# Patient Record
Sex: MALE | Race: WHITE | NOT HISPANIC OR LATINO | Employment: FULL TIME | URBAN - METROPOLITAN AREA
[De-identification: names, ages, dates, MRNs, and addresses within clinical notes are randomized per-mention and may not be internally consistent; named-entity substitution may affect disease eponyms.]

---

## 2023-02-15 PROBLEM — E66.3 OVERWEIGHT WITH BODY MASS INDEX (BMI) OF 28 TO 28.9 IN ADULT: Status: ACTIVE | Noted: 2023-02-15

## 2023-02-15 PROBLEM — M79.10 MYALGIA: Status: ACTIVE | Noted: 2023-02-15

## 2023-02-15 PROBLEM — K63.5 COLON POLYPS: Status: ACTIVE | Noted: 2023-02-15

## 2023-02-15 PROBLEM — E29.1 HYPOGONADISM, MALE: Status: ACTIVE | Noted: 2023-02-15

## 2023-02-15 PROBLEM — R93.1 AGATSTON CORONARY ARTERY CALCIUM SCORE LESS THAN 100: Status: ACTIVE | Noted: 2023-02-15

## 2023-02-15 PROBLEM — R74.8 ELEVATED LIVER ENZYMES: Status: ACTIVE | Noted: 2023-02-15

## 2023-02-15 PROBLEM — I10 BENIGN HYPERTENSION: Status: ACTIVE | Noted: 2023-02-15

## 2023-02-15 PROBLEM — D72.819 LEUKOPENIA: Status: ACTIVE | Noted: 2023-02-15

## 2023-02-15 PROBLEM — J30.9 ALLERGIC RHINITIS: Status: ACTIVE | Noted: 2023-02-15

## 2023-02-15 PROBLEM — E03.8 SUBCLINICAL HYPOTHYROIDISM: Status: ACTIVE | Noted: 2023-02-15

## 2023-02-15 PROBLEM — I10 HYPERTENSION: Status: ACTIVE | Noted: 2023-02-15

## 2023-02-15 PROBLEM — K20.90 ESOPHAGITIS: Status: ACTIVE | Noted: 2023-02-15

## 2023-02-15 PROBLEM — E78.5 HYPERLIPIDEMIA: Status: ACTIVE | Noted: 2023-02-15

## 2023-02-15 PROBLEM — K44.9 HIATAL HERNIA: Status: ACTIVE | Noted: 2023-02-15

## 2023-02-15 PROBLEM — R73.01 ELEVATED FASTING BLOOD SUGAR: Status: ACTIVE | Noted: 2023-02-15

## 2023-02-15 RX ORDER — OMEPRAZOLE 40 MG/1
1 CAPSULE, DELAYED RELEASE ORAL DAILY
COMMUNITY
Start: 2022-06-14 | End: 2023-05-01

## 2023-02-15 RX ORDER — TESTOSTERONE 30 MG/1.5ML
1 SOLUTION TOPICAL DAILY
COMMUNITY
Start: 2014-02-26

## 2023-02-15 RX ORDER — AMLODIPINE BESYLATE 10 MG/1
1 TABLET ORAL DAILY
COMMUNITY
Start: 2022-06-23

## 2023-02-15 RX ORDER — SELENIUM SULFIDE 2.5 MG/100ML
LOTION TOPICAL
COMMUNITY
Start: 2013-08-21

## 2023-02-15 RX ORDER — TADALAFIL 20 MG/1
1 TABLET ORAL AS NEEDED
COMMUNITY
Start: 2014-02-26

## 2023-02-15 RX ORDER — DESLORATADINE 5 MG/1
1 TABLET ORAL DAILY
COMMUNITY
Start: 2014-02-26

## 2023-02-15 RX ORDER — FLUTICASONE PROPIONATE 50 MCG
2 SPRAY, SUSPENSION (ML) NASAL DAILY
COMMUNITY
Start: 2014-02-26

## 2023-02-15 RX ORDER — LISINOPRIL 40 MG/1
1 TABLET ORAL DAILY
COMMUNITY
Start: 2019-05-17 | End: 2023-03-11

## 2023-02-15 RX ORDER — ATORVASTATIN CALCIUM 20 MG/1
1 TABLET, FILM COATED ORAL DAILY
COMMUNITY
Start: 2019-05-17

## 2023-03-11 DIAGNOSIS — I10 ESSENTIAL (PRIMARY) HYPERTENSION: ICD-10-CM

## 2023-03-11 RX ORDER — LISINOPRIL 40 MG/1
TABLET ORAL
Qty: 90 TABLET | Refills: 1 | Status: SHIPPED | OUTPATIENT
Start: 2023-03-11 | End: 2023-08-10

## 2023-04-13 ENCOUNTER — APPOINTMENT (OUTPATIENT)
Dept: PRIMARY CARE | Facility: CLINIC | Age: 53
End: 2023-04-13
Payer: COMMERCIAL

## 2023-04-29 DIAGNOSIS — I10 ESSENTIAL (PRIMARY) HYPERTENSION: ICD-10-CM

## 2023-04-29 DIAGNOSIS — K20.90 ESOPHAGITIS: ICD-10-CM

## 2023-05-01 RX ORDER — AMLODIPINE BESYLATE 2.5 MG/1
TABLET ORAL
Qty: 90 TABLET | Refills: 1 | Status: SHIPPED | OUTPATIENT
Start: 2023-05-01

## 2023-05-01 RX ORDER — OMEPRAZOLE 40 MG/1
CAPSULE, DELAYED RELEASE ORAL
Qty: 90 CAPSULE | Refills: 1 | Status: SHIPPED | OUTPATIENT
Start: 2023-05-01

## 2023-05-03 ENCOUNTER — APPOINTMENT (OUTPATIENT)
Dept: PRIMARY CARE | Facility: CLINIC | Age: 53
End: 2023-05-03
Payer: COMMERCIAL

## 2023-05-24 ENCOUNTER — APPOINTMENT (OUTPATIENT)
Dept: PRIMARY CARE | Facility: CLINIC | Age: 53
End: 2023-05-24
Payer: COMMERCIAL

## 2023-06-20 ENCOUNTER — APPOINTMENT (OUTPATIENT)
Dept: PRIMARY CARE | Facility: CLINIC | Age: 53
End: 2023-06-20
Payer: COMMERCIAL

## 2023-07-13 ENCOUNTER — APPOINTMENT (OUTPATIENT)
Dept: PRIMARY CARE | Facility: CLINIC | Age: 53
End: 2023-07-13
Payer: COMMERCIAL

## 2023-08-08 DIAGNOSIS — I10 ESSENTIAL (PRIMARY) HYPERTENSION: ICD-10-CM

## 2023-08-10 RX ORDER — LISINOPRIL 40 MG/1
TABLET ORAL
Qty: 30 TABLET | Refills: 0 | Status: SHIPPED | OUTPATIENT
Start: 2023-08-10 | End: 2024-03-11

## 2023-10-19 ENCOUNTER — OFFICE VISIT (OUTPATIENT)
Dept: DERMATOLOGY | Facility: CLINIC | Age: 53
End: 2023-10-19
Payer: COMMERCIAL

## 2023-10-19 DIAGNOSIS — B07.8 OTHER VIRAL WARTS: ICD-10-CM

## 2023-10-19 DIAGNOSIS — B07.0 PLANTAR WART: Primary | ICD-10-CM

## 2023-10-19 PROCEDURE — 17110 DESTRUCTION B9 LES UP TO 14: CPT | Performed by: NURSE PRACTITIONER

## 2023-10-19 NOTE — PROGRESS NOTES
Subjective     Mikey Valadez is a 52 y.o. male who presents for the following: Wart (Presents to office today for LN2 to left plantar wart. No further complaints.).     Review of Systems:  No other skin or systemic complaints other than what is documented elsewhere in the note.    The following portions of the chart were reviewed this encounter and updated as appropriate:          Skin Cancer History  No skin cancer on file.      Specialty Problems    None       Objective   Well appearing patient in no apparent distress; mood and affect are within normal limits.    A focused skin examination was performed. All findings within normal limits unless otherwise noted below.    Assessment/Plan   1. Plantar wart    Related Procedures  Follow Up In Dermatology - Established Patient    2. Other viral warts  Right Foot - Posterior  Verrucous, hyperkeratotic papules    Warts   - Warts are benign growths which are the result of a specific viral infection in the top layer top layer of the skin. As with other infections, warts may be transmitted from one individual to another.   - Warts are transmitted directly by touching another person with warts, or indirectly by touching an object with the wart virus on it. It may take months for the growth to develop after contacting the virus.   - Multiple treatments are often necessary to clear warts.   - There are multiple treatments for warts, none of which is 100% effective. No matter what treatment, 1 in 3 treated warts will recur.   - Hand out given to patient.     Plan  - Cryotherapy in office, patient tolerated well.   - Risks, benefits, side effects, alternatives and options were discussed with patient and the patient voiced understanding.      Destr of lesion - Right Foot - Posterior  Complexity: simple    Destruction method: cryotherapy    Informed consent: discussed and consent obtained    Lesion destroyed using liquid nitrogen: Yes    Cryotherapy cycles:  2  Outcome: patient  tolerated procedure well with no complications    Post-procedure details: wound care instructions given

## 2023-10-31 ENCOUNTER — ANCILLARY PROCEDURE (OUTPATIENT)
Dept: RADIOLOGY | Facility: CLINIC | Age: 53
End: 2023-10-31
Payer: COMMERCIAL

## 2023-10-31 DIAGNOSIS — Z91.89 OTHER SPECIFIED PERSONAL RISK FACTORS, NOT ELSEWHERE CLASSIFIED: ICD-10-CM

## 2023-10-31 PROCEDURE — 75571 CT HRT W/O DYE W/CA TEST: CPT | Mod: LIO

## 2023-11-02 ENCOUNTER — APPOINTMENT (OUTPATIENT)
Dept: DERMATOLOGY | Facility: CLINIC | Age: 53
End: 2023-11-02
Payer: COMMERCIAL

## 2023-11-30 ENCOUNTER — APPOINTMENT (OUTPATIENT)
Dept: DERMATOLOGY | Facility: CLINIC | Age: 53
End: 2023-11-30
Payer: COMMERCIAL

## 2023-12-13 ENCOUNTER — APPOINTMENT (OUTPATIENT)
Dept: DERMATOLOGY | Facility: CLINIC | Age: 53
End: 2023-12-13
Payer: COMMERCIAL

## 2024-01-02 ENCOUNTER — APPOINTMENT (OUTPATIENT)
Dept: DERMATOLOGY | Facility: CLINIC | Age: 54
End: 2024-01-02
Payer: COMMERCIAL

## 2024-01-04 ENCOUNTER — APPOINTMENT (OUTPATIENT)
Dept: DERMATOLOGY | Facility: CLINIC | Age: 54
End: 2024-01-04
Payer: COMMERCIAL

## 2024-01-30 ENCOUNTER — APPOINTMENT (OUTPATIENT)
Dept: DERMATOLOGY | Facility: CLINIC | Age: 54
End: 2024-01-30
Payer: COMMERCIAL

## 2024-02-06 ENCOUNTER — OFFICE VISIT (OUTPATIENT)
Dept: DERMATOLOGY | Facility: CLINIC | Age: 54
End: 2024-02-06
Payer: COMMERCIAL

## 2024-02-06 DIAGNOSIS — B07.0 PLANTAR WART: Primary | ICD-10-CM

## 2024-02-06 PROCEDURE — 17110 DESTRUCTION B9 LES UP TO 14: CPT | Performed by: NURSE PRACTITIONER

## 2024-02-07 NOTE — PROGRESS NOTES
Subjective     Mikey Valadez is a 53 y.o. male who presents for the following: Wart (Plantar wart (left).).     Review of Systems:  No other skin or systemic complaints other than what is documented elsewhere in the note.    The following portions of the chart were reviewed this encounter and updated as appropriate:  Tobacco  Allergies  Meds  Problems  Med Hx  Surg Hx  Fam Hx         Skin Cancer History  No skin cancer on file.      Specialty Problems    None       Objective   Well appearing patient in no apparent distress; mood and affect are within normal limits.    A full examination was performed including scalp, head, eyes, ears, nose, lips, neck, chest, axillae, abdomen, back, buttocks, bilateral upper extremities, bilateral lower extremities, hands, feet, fingers, toes, fingernails, and toenails. All findings within normal limits unless otherwise noted below.    Assessment/Plan   1. Plantar wart (2)  Left 2nd Metatarsal Plantar Area, Left Tip of Hallux  Verrucous papule(s)/plaque(s)    -Discussed nature of condition  -Multiple treatments in office are often needed  -Diligent at home therapy is often needed  -Cryotherapy today  -Possible side effects of liquid nitrogen treatment reviewed including formation of blisters, crusting, tenderness, scar, and discoloration which may be permanent.  -Patient advised to return the office for re-evaluation if the treated lesion(s) do not resolve within 4-6 weeks. Patient verbalizes understanding.    Destr of lesion - Left 2nd Metatarsal Plantar Area, Left Tip of Hallux  Complexity: simple    Destruction method: cryotherapy    Informed consent: discussed and consent obtained    Lesion destroyed using liquid nitrogen: Yes    Cryotherapy cycles:  2  Outcome: patient tolerated procedure well with no complications    Post-procedure details: wound care instructions given

## 2024-03-09 DIAGNOSIS — I10 ESSENTIAL (PRIMARY) HYPERTENSION: ICD-10-CM

## 2024-03-11 RX ORDER — LISINOPRIL 40 MG/1
TABLET ORAL
Qty: 30 TABLET | Refills: 0 | Status: SHIPPED | OUTPATIENT
Start: 2024-03-11 | End: 2024-03-11 | Stop reason: ALTCHOICE

## 2024-03-11 RX ORDER — TADALAFIL 20 MG/1
20 TABLET ORAL AS NEEDED
Qty: 10 TABLET | Status: CANCELLED | OUTPATIENT
Start: 2024-03-11

## 2024-03-13 ENCOUNTER — APPOINTMENT (OUTPATIENT)
Dept: DERMATOLOGY | Facility: CLINIC | Age: 54
End: 2024-03-13
Payer: COMMERCIAL

## 2024-04-10 ENCOUNTER — APPOINTMENT (OUTPATIENT)
Dept: DERMATOLOGY | Facility: CLINIC | Age: 54
End: 2024-04-10
Payer: COMMERCIAL

## 2024-05-08 ENCOUNTER — APPOINTMENT (OUTPATIENT)
Dept: DERMATOLOGY | Facility: CLINIC | Age: 54
End: 2024-05-08
Payer: COMMERCIAL

## 2024-06-05 ENCOUNTER — APPOINTMENT (OUTPATIENT)
Dept: DERMATOLOGY | Facility: CLINIC | Age: 54
End: 2024-06-05
Payer: COMMERCIAL

## 2024-06-24 ENCOUNTER — APPOINTMENT (OUTPATIENT)
Dept: DERMATOLOGY | Facility: CLINIC | Age: 54
End: 2024-06-24
Payer: COMMERCIAL

## 2024-10-09 ENCOUNTER — APPOINTMENT (OUTPATIENT)
Dept: DERMATOLOGY | Facility: CLINIC | Age: 54
End: 2024-10-09
Payer: COMMERCIAL

## 2024-12-08 ENCOUNTER — OFFICE VISIT (OUTPATIENT)
Dept: URGENT CARE | Age: 54
End: 2024-12-08
Payer: COMMERCIAL

## 2024-12-08 ENCOUNTER — HOSPITAL ENCOUNTER (EMERGENCY)
Facility: HOSPITAL | Age: 54
Discharge: HOME | End: 2024-12-08
Payer: COMMERCIAL

## 2024-12-08 ENCOUNTER — APPOINTMENT (OUTPATIENT)
Dept: RADIOLOGY | Facility: HOSPITAL | Age: 54
End: 2024-12-08
Payer: COMMERCIAL

## 2024-12-08 VITALS
DIASTOLIC BLOOD PRESSURE: 99 MMHG | HEIGHT: 71 IN | HEART RATE: 97 BPM | SYSTOLIC BLOOD PRESSURE: 149 MMHG | TEMPERATURE: 97.9 F | BODY MASS INDEX: 28.7 KG/M2 | OXYGEN SATURATION: 95 % | RESPIRATION RATE: 18 BRPM | WEIGHT: 205 LBS

## 2024-12-08 VITALS
HEART RATE: 78 BPM | RESPIRATION RATE: 16 BRPM | TEMPERATURE: 98 F | SYSTOLIC BLOOD PRESSURE: 138 MMHG | OXYGEN SATURATION: 97 % | DIASTOLIC BLOOD PRESSURE: 88 MMHG

## 2024-12-08 DIAGNOSIS — M17.11 ARTHROPATHY OF RIGHT KNEE: Primary | ICD-10-CM

## 2024-12-08 DIAGNOSIS — R60.0 LOWER EXTREMITY EDEMA: ICD-10-CM

## 2024-12-08 DIAGNOSIS — M25.561 ACUTE PAIN OF RIGHT KNEE: Primary | ICD-10-CM

## 2024-12-08 LAB
ALBUMIN SERPL BCP-MCNC: 4.6 G/DL (ref 3.4–5)
ALP SERPL-CCNC: 55 U/L (ref 33–120)
ALT SERPL W P-5'-P-CCNC: 15 U/L (ref 10–52)
ANION GAP SERPL CALC-SCNC: 15 MMOL/L (ref 10–20)
AST SERPL W P-5'-P-CCNC: 19 U/L (ref 9–39)
BASOPHILS # BLD AUTO: 0.02 X10*3/UL (ref 0–0.1)
BASOPHILS NFR BLD AUTO: 0.3 %
BASOPHILS NFR FLD MANUAL: 0 %
BILIRUB SERPL-MCNC: 1.3 MG/DL (ref 0–1.2)
BLASTS NFR FLD MANUAL: 0 %
BUN SERPL-MCNC: 15 MG/DL (ref 6–23)
CALCIUM SERPL-MCNC: 9.7 MG/DL (ref 8.6–10.3)
CHLORIDE SERPL-SCNC: 100 MMOL/L (ref 98–107)
CLARITY FLD: ABNORMAL
CO2 SERPL-SCNC: 25 MMOL/L (ref 21–32)
COLOR FLD: YELLOW
CREAT SERPL-MCNC: 0.83 MG/DL (ref 0.5–1.3)
CRP SERPL-MCNC: 7.25 MG/DL
EGFRCR SERPLBLD CKD-EPI 2021: >90 ML/MIN/1.73M*2
EOSINOPHIL # BLD AUTO: 0.04 X10*3/UL (ref 0–0.7)
EOSINOPHIL NFR BLD AUTO: 0.5 %
EOSINOPHIL NFR FLD MANUAL: 0 %
ERYTHROCYTE [DISTWIDTH] IN BLOOD BY AUTOMATED COUNT: 13.1 % (ref 11.5–14.5)
ERYTHROCYTE [SEDIMENTATION RATE] IN BLOOD BY WESTERGREN METHOD: 29 MM/H (ref 0–20)
GLUCOSE SERPL-MCNC: 101 MG/DL (ref 74–99)
HCT VFR BLD AUTO: 36.9 % (ref 41–52)
HGB BLD-MCNC: 13.3 G/DL (ref 13.5–17.5)
IMM GRANULOCYTES # BLD AUTO: 0.04 X10*3/UL (ref 0–0.7)
IMM GRANULOCYTES NFR BLD AUTO: 0.5 % (ref 0–0.9)
IMMATURE GRANULOCYTES IN FLUID: 0 %
LACTATE SERPL-SCNC: 0.9 MMOL/L (ref 0.4–2)
LYMPHOCYTES # BLD AUTO: 0.65 X10*3/UL (ref 1.2–4.8)
LYMPHOCYTES NFR BLD AUTO: 8.4 %
LYMPHOCYTES NFR FLD MANUAL: 4 %
MCH RBC QN AUTO: 31.1 PG (ref 26–34)
MCHC RBC AUTO-ENTMCNC: 36 G/DL (ref 32–36)
MCV RBC AUTO: 86 FL (ref 80–100)
MONOCYTES # BLD AUTO: 0.78 X10*3/UL (ref 0.1–1)
MONOCYTES NFR BLD AUTO: 10.1 %
MONOS+MACROS NFR FLD MANUAL: 1 %
NEUTROPHILS # BLD AUTO: 6.22 X10*3/UL (ref 1.2–7.7)
NEUTROPHILS NFR BLD AUTO: 80.2 %
NEUTROPHILS NFR FLD MANUAL: 95 %
NRBC BLD-RTO: 0 /100 WBCS (ref 0–0)
OTHER CELLS NFR FLD MANUAL: 0 %
PLASMA CELLS NFR FLD MANUAL: 0 %
PLATELET # BLD AUTO: 161 X10*3/UL (ref 150–450)
POTASSIUM SERPL-SCNC: 3.2 MMOL/L (ref 3.5–5.3)
PROT SERPL-MCNC: 7.2 G/DL (ref 6.4–8.2)
RBC # BLD AUTO: 4.28 X10*6/UL (ref 4.5–5.9)
RBC # FLD AUTO: 2000 /UL
SODIUM SERPL-SCNC: 137 MMOL/L (ref 136–145)
TOTAL CELLS COUNTED FLD: 100
URATE SERPL-MCNC: 7.8 MG/DL (ref 4–7.5)
WBC # BLD AUTO: 7.8 X10*3/UL (ref 4.4–11.3)
WBC # FLD AUTO: ABNORMAL /UL

## 2024-12-08 PROCEDURE — 80053 COMPREHEN METABOLIC PANEL: CPT | Performed by: PHYSICIAN ASSISTANT

## 2024-12-08 PROCEDURE — 87205 SMEAR GRAM STAIN: CPT | Mod: PORLAB | Performed by: PHYSICIAN ASSISTANT

## 2024-12-08 PROCEDURE — 73564 X-RAY EXAM KNEE 4 OR MORE: CPT | Mod: RT

## 2024-12-08 PROCEDURE — 3075F SYST BP GE 130 - 139MM HG: CPT

## 2024-12-08 PROCEDURE — 73564 X-RAY EXAM KNEE 4 OR MORE: CPT | Mod: RIGHT SIDE | Performed by: RADIOLOGY

## 2024-12-08 PROCEDURE — 3079F DIAST BP 80-89 MM HG: CPT

## 2024-12-08 PROCEDURE — 83605 ASSAY OF LACTIC ACID: CPT | Performed by: PHYSICIAN ASSISTANT

## 2024-12-08 PROCEDURE — 82945 GLUCOSE OTHER FLUID: CPT | Mod: PORLAB | Performed by: PHYSICIAN ASSISTANT

## 2024-12-08 PROCEDURE — 20611 DRAIN/INJ JOINT/BURSA W/US: CPT | Performed by: PHYSICIAN ASSISTANT

## 2024-12-08 PROCEDURE — 89051 BODY FLUID CELL COUNT: CPT | Performed by: PHYSICIAN ASSISTANT

## 2024-12-08 PROCEDURE — 86140 C-REACTIVE PROTEIN: CPT | Performed by: PHYSICIAN ASSISTANT

## 2024-12-08 PROCEDURE — 99204 OFFICE O/P NEW MOD 45 MIN: CPT

## 2024-12-08 PROCEDURE — 2500000004 HC RX 250 GENERAL PHARMACY W/ HCPCS (ALT 636 FOR OP/ED): Performed by: PHYSICIAN ASSISTANT

## 2024-12-08 PROCEDURE — 99284 EMERGENCY DEPT VISIT MOD MDM: CPT | Mod: 25

## 2024-12-08 PROCEDURE — 2500000001 HC RX 250 WO HCPCS SELF ADMINISTERED DRUGS (ALT 637 FOR MEDICARE OP): Performed by: PHYSICIAN ASSISTANT

## 2024-12-08 PROCEDURE — 85025 COMPLETE CBC W/AUTO DIFF WBC: CPT | Performed by: PHYSICIAN ASSISTANT

## 2024-12-08 PROCEDURE — 96374 THER/PROPH/DIAG INJ IV PUSH: CPT

## 2024-12-08 PROCEDURE — 84157 ASSAY OF PROTEIN OTHER: CPT | Mod: PORLAB | Performed by: PHYSICIAN ASSISTANT

## 2024-12-08 PROCEDURE — 96375 TX/PRO/DX INJ NEW DRUG ADDON: CPT

## 2024-12-08 PROCEDURE — 36415 COLL VENOUS BLD VENIPUNCTURE: CPT | Performed by: PHYSICIAN ASSISTANT

## 2024-12-08 PROCEDURE — 84550 ASSAY OF BLOOD/URIC ACID: CPT | Performed by: PHYSICIAN ASSISTANT

## 2024-12-08 PROCEDURE — 89060 EXAM SYNOVIAL FLUID CRYSTALS: CPT | Mod: PORLAB | Performed by: PHYSICIAN ASSISTANT

## 2024-12-08 PROCEDURE — 85652 RBC SED RATE AUTOMATED: CPT | Performed by: PHYSICIAN ASSISTANT

## 2024-12-08 RX ORDER — KETOROLAC TROMETHAMINE 30 MG/ML
30 INJECTION, SOLUTION INTRAMUSCULAR; INTRAVENOUS ONCE
Status: COMPLETED | OUTPATIENT
Start: 2024-12-08 | End: 2024-12-08

## 2024-12-08 RX ORDER — ONDANSETRON HYDROCHLORIDE 2 MG/ML
4 INJECTION, SOLUTION INTRAVENOUS ONCE
Status: COMPLETED | OUTPATIENT
Start: 2024-12-08 | End: 2024-12-08

## 2024-12-08 RX ORDER — DOXYCYCLINE 100 MG/1
100 CAPSULE ORAL ONCE
Status: COMPLETED | OUTPATIENT
Start: 2024-12-08 | End: 2024-12-08

## 2024-12-08 RX ORDER — LIDOCAINE HYDROCHLORIDE 10 MG/ML
10 INJECTION, SOLUTION INFILTRATION; PERINEURAL ONCE
Status: COMPLETED | OUTPATIENT
Start: 2024-12-08 | End: 2024-12-08

## 2024-12-08 RX ORDER — COLCHICINE 0.6 MG/1
0.6 TABLET ORAL 2 TIMES DAILY
Qty: 20 TABLET | Refills: 0 | Status: SHIPPED | OUTPATIENT
Start: 2024-12-08 | End: 2024-12-18

## 2024-12-08 RX ORDER — MORPHINE SULFATE 2 MG/ML
2 INJECTION, SOLUTION INTRAMUSCULAR; INTRAVENOUS ONCE
Status: COMPLETED | OUTPATIENT
Start: 2024-12-08 | End: 2024-12-08

## 2024-12-08 RX ORDER — INDOMETHACIN 25 MG/1
25 CAPSULE ORAL 3 TIMES DAILY PRN
Qty: 9 CAPSULE | Refills: 0 | Status: SHIPPED | OUTPATIENT
Start: 2024-12-08 | End: 2024-12-11

## 2024-12-08 RX ORDER — DOXYCYCLINE 100 MG/1
100 CAPSULE ORAL 2 TIMES DAILY
Qty: 14 CAPSULE | Refills: 0 | Status: SHIPPED | OUTPATIENT
Start: 2024-12-08 | End: 2024-12-15

## 2024-12-08 RX ORDER — HYDROCODONE BITARTRATE AND ACETAMINOPHEN 5; 325 MG/1; MG/1
1 TABLET ORAL EVERY 6 HOURS PRN
Qty: 12 TABLET | Refills: 0 | Status: SHIPPED | OUTPATIENT
Start: 2024-12-08 | End: 2024-12-11

## 2024-12-08 ASSESSMENT — LIFESTYLE VARIABLES
EVER FELT BAD OR GUILTY ABOUT YOUR DRINKING: NO
TOTAL SCORE: 0
HAVE YOU EVER FELT YOU SHOULD CUT DOWN ON YOUR DRINKING: NO
EVER HAD A DRINK FIRST THING IN THE MORNING TO STEADY YOUR NERVES TO GET RID OF A HANGOVER: NO
HAVE PEOPLE ANNOYED YOU BY CRITICIZING YOUR DRINKING: NO

## 2024-12-08 ASSESSMENT — ENCOUNTER SYMPTOMS
JOINT SWELLING: 1
WOUND: 0
COLOR CHANGE: 1
SHORTNESS OF BREATH: 0
VOMITING: 0
ARTHRALGIAS: 1
NAUSEA: 0
DIARRHEA: 0
FATIGUE: 0
FEVER: 0

## 2024-12-08 ASSESSMENT — PAIN SCALES - GENERAL
PAINLEVEL_OUTOF10: 10 - WORST POSSIBLE PAIN
PAINLEVEL_OUTOF10: 8
PAINLEVEL_OUTOF10: 9
PAINLEVEL_OUTOF10: 8
PAINLEVEL_OUTOF10: 6

## 2024-12-08 ASSESSMENT — PAIN DESCRIPTION - FREQUENCY: FREQUENCY: CONSTANT/CONTINUOUS

## 2024-12-08 ASSESSMENT — PAIN DESCRIPTION - PAIN TYPE: TYPE: ACUTE PAIN

## 2024-12-08 ASSESSMENT — COLUMBIA-SUICIDE SEVERITY RATING SCALE - C-SSRS
2. HAVE YOU ACTUALLY HAD ANY THOUGHTS OF KILLING YOURSELF?: NO
6. HAVE YOU EVER DONE ANYTHING, STARTED TO DO ANYTHING, OR PREPARED TO DO ANYTHING TO END YOUR LIFE?: NO
1. IN THE PAST MONTH, HAVE YOU WISHED YOU WERE DEAD OR WISHED YOU COULD GO TO SLEEP AND NOT WAKE UP?: NO

## 2024-12-08 ASSESSMENT — PAIN DESCRIPTION - ORIENTATION: ORIENTATION: RIGHT

## 2024-12-08 ASSESSMENT — PAIN DESCRIPTION - LOCATION: LOCATION: KNEE

## 2024-12-08 ASSESSMENT — PAIN DESCRIPTION - DESCRIPTORS: DESCRIPTORS: SHARP

## 2024-12-08 ASSESSMENT — PAIN - FUNCTIONAL ASSESSMENT: PAIN_FUNCTIONAL_ASSESSMENT: 0-10

## 2024-12-08 NOTE — PROGRESS NOTES
Subjective   Patient ID: Mikey Valadez is a 54 y.o. male. They present today with a chief complaint of knee pain right  (Denies injury. Onset Monday).    History of Present Illness  Patient presents with right knee pain that began 6 days ago. Explains that he has had no injury to the area, denies any fall or blunt force trauma. Explains that over the last week right knee has become increasingly painful and today it is hot, swollen and patient is unable to bear weight on right knee. Denies fever, chills, sweats. Denies n/v/d. Denies chest pain, sob.   Denies any previous open wound or other skin changes.           Past Medical History  Allergies as of 12/08/2024    (No Known Allergies)       (Not in a hospital admission)       No past medical history on file.    Past Surgical History:   Procedure Laterality Date    OTHER SURGICAL HISTORY  02/28/2019    Tonsillectomy            Review of Systems  Review of Systems   Constitutional:  Negative for fatigue and fever.   Respiratory:  Negative for shortness of breath.    Cardiovascular:  Negative for chest pain.   Gastrointestinal:  Negative for diarrhea, nausea and vomiting.   Musculoskeletal:  Positive for arthralgias and joint swelling.   Skin:  Positive for color change. Negative for rash and wound.                                  Objective    Vitals:    12/08/24 1140   BP: 138/88   Pulse: 78   Resp: 16   Temp: 36.7 °C (98 °F)   SpO2: 97%     No LMP for male patient.    Physical Exam  Constitutional:       General: He is not in acute distress.     Appearance: He is not ill-appearing.   Musculoskeletal:      Right knee: Swelling and erythema present. Decreased range of motion. Tenderness present. Normal pulse.      Right lower leg: Swelling present. 1+ Pitting Edema present.      Comments: Right knee with generalized swelling, erythema, and tenderness. Decreased flexion secondary to pain. Lower right extremity with pitting edema.    Skin:     Findings: Erythema  present. No abrasion or abscess.      Comments: Generalized erythema to anterior right knee.    Neurological:      Mental Status: He is alert.         Procedures    Point of Care Test & Imaging Results from this visit  No results found for this visit on 12/08/24.   XR knee right 4+ views    Result Date: 12/8/2024  Interpreted By:  Antolin Mcdonald, STUDY: XR KNEE RIGHT 4+ VIEWS; 12/8/2024 1:33 pm   INDICATION: Signs/Symptoms:pain, swelling   COMPARISON: None.   ACCESSION NUMBER(S): [id number]   ORDERING CLINICIAN: OTTO MTZ   TECHNIQUE: Number of films: Four view radiographs of the right knee.   FINDINGS: No fractures or destructive lesions are identified. The joint spaces and articular surfaces are maintained. The alignment is anatomic. The soft tissues are unremarkable. There is moderate effusion in the suprapatellar bursa.       Moderate suprapatellar joint effusion without underlying fracture or subluxation.   Signed by: Antolin Mcdonald 12/8/2024 1:37 PM Dictation workstation:   [id number]     Diagnostic study results (if any) were reviewed by Pati Vyas PA-C.    Assessment/Plan   Allergies, medications, history, and pertinent labs/EKGs/Imaging reviewed by Pati Vyas PA-C.     Medical Decision Making  MDM- Patient presents today with history and physical exam consistent with concern for possible septic arthritis vs DVT. At this time patient will be sent to ER for further evaluation, labs, imaging and possible iv antibiotic. Patient transferred via private vehicle to ER with family member. Patient and family member verbalized understanding and agree with plan.     Orders and Diagnoses  Diagnoses and all orders for this visit:  Acute pain of right knee  Lower extremity edema      Medical Admin Record      Patient disposition: Home    Electronically signed by Pati Vyas PA-C  2:10 PM

## 2024-12-08 NOTE — Clinical Note
Mikey Valadez was seen and treated in our emergency department on 12/8/2024.  He may return to work on 12/16/2024.  Mikey has limited mobility and will need to work from home from now until 12/16/24.     If you have any questions or concerns, please don't hesitate to call.      Donovan Mcconnell PA-C

## 2024-12-08 NOTE — ED PROVIDER NOTES
HPI   Chief Complaint   Patient presents with    concern septic right knee       history of present illness: 54-year-old male complains of right knee pain for the last few days.  Denies any specific injury.  He is having some swelling in his feet on and off for the last couple months.  He was taken off the hydrochlorothiazide because it was felt that it was possibly causing gout.  Patient has not had analysis of his joints for confirmation.  Says that he has some redness of the knee and it is painful for him to ambulate.  Says the pain is 9 out of 10, aching, persistent, worse with placing weight on the knee.  Patient states he is able to partially bend the knee though it is painful to fully flex or extend.  Denies fevers chills sweats nausea vomiting lightheadedness dizziness.  He was seen at urgent care and is recommended for him to go to the emergency department for joint analysis.    Review of systems: Constitutional, eye, ENT, cardiovascular, respiratory, gastrointestinal, genitourinary, neurologic, musculoskeletal, dermatologic, hematologic, endocrine systems were evaluated and were negative unless otherwise specified in history of present illness.    Medications: Reviewed and per nursing note.    Family history: Denies relevant medical conditions.    Past medical history: None per patient    Social history: Denies tobacco, alcohol, drug use.      Physical exam:    Appearance: Well-developed, well-nourished, nontoxic-appearing, alert and oriented x3. Talking in complete sentences.    HEENT:  Head normocephalic atraumatic,    NECK:  Nml Inspection    Respiratory: Clear to auscultation    Cardiovascular: Regular rate and rhythm. Capillary refill less than 3 seconds on all extremities.    Neuro:  Oriented x 3, Speech Clear, cranial nerves grossly intact. Normal sensation to light touch in all 4 extremities. Deep tendon reflexes 2+ symmetrically in upper and lower extremities.    Musculoskeletal: Tenderness right  anterior knee with slight erythema.  Able to flex and extend the knee but just not completely.  Ballottement of the knee.  No induration of skin.  Patient spontaneously moves all 4 extremities with 5/5 muscle strength.    Skin:  No cyanosis, clubbing, open wounds, or rashes.            Patient History   No past medical history on file.  Past Surgical History:   Procedure Laterality Date    OTHER SURGICAL HISTORY  02/28/2019    Tonsillectomy     Family History   Problem Relation Name Age of Onset    Hyperlipidemia Mother      Hypertension Mother      Skin cancer Mother      Leukemia Father      Heart attack Maternal Grandfather      Other (heavy smoker) Maternal Grandfather      Aneurysm Paternal Grandfather       Social History     Tobacco Use    Smoking status: Unknown    Smokeless tobacco: Not on file   Substance Use Topics    Alcohol use: Not on file    Drug use: Not on file       Physical Exam   ED Triage Vitals [12/08/24 1213]   Temperature Heart Rate Respirations BP   36.9 °C (98.4 °F) 73 16 148/79      Pulse Ox Temp Source Heart Rate Source Patient Position   98 % Temporal Monitor Sitting      BP Location FiO2 (%)     Left arm --       Physical Exam      ED Course & MDM   Diagnoses as of 12/08/24 1614   Arthropathy of right knee                 No data recorded     Rebekah Coma Scale Score: 15 (12/08/24 1211 : Sharlene Plata RN)                           Medical Decision Making  XR knee right 4+ views   Final Result    Moderate suprapatellar joint effusion without underlying fracture or    subluxation.          Signed by: Antolin Mcdonald 12/8/2024 1:37 PM    Dictation workstation:   [id number]     Point of Care Ultrasound    (Results Pending)    Labs Reviewed  CBC WITH AUTO DIFFERENTIAL - Abnormal     WBC                           7.8                    nRBC                          0.0                    RBC                           4.28 (*)               Hemoglobin                    13.3 (*)                Hematocrit                    36.9 (*)               MCV                           86                     MCH                           31.1                   MCHC                          36.0                   RDW                           13.1                   Platelets                     161                    Neutrophils %                 80.2                   Immature Granulocytes %, Automated   0.5                    Lymphocytes %                 8.4                    Monocytes %                   10.1                   Eosinophils %                 0.5                    Basophils %                   0.3                    Neutrophils Absolute          6.22                   Immature Granulocytes Absolute, Au*   0.04                   Lymphocytes Absolute          0.65 (*)               Monocytes Absolute            0.78                   Eosinophils Absolute          0.04                   Basophils Absolute            0.02                COMPREHENSIVE METABOLIC PANEL - Abnormal     Glucose                       101 (*)                Sodium                        137                    Potassium                     3.2 (*)                Chloride                      100                    Bicarbonate                   25                     Anion Gap                     15                     Urea Nitrogen                 15                     Creatinine                    0.83                   eGFR                          >90                    Calcium                       9.7                    Albumin                       4.6                    Alkaline Phosphatase          55                     Total Protein                 7.2                    AST                           19                     Bilirubin, Total              1.3 (*)                ALT                           15                  C-REACTIVE PROTEIN - Abnormal     C-Reactive Protein            7.25 (*)             SEDIMENTATION RATE, AUTOMATED - Abnormal     Sedimentation Rate            29 (*)              BODY FLUID CELL COUNT - Abnormal     Color, Fluid                  Yellow                 Clarity, Fluid                Cloudy (*)               WBC, Fluid                    26,934                 RBC, Fluid                    2,000                      Narrative: Body Fluid cell count reference ranges have not been established by [facility]. Based on published references.  URIC ACID - Abnormal     Uric Acid                     7.8 (*)             LACTATE - Normal     Lactate                       0.9                        Narrative: Venipuncture immediately after or during the administration of Metamizole may lead to falsely low results. Testing should be performed immediately prior to Metamizole dosing.  STERILE FLUID CULTURE/SMEAR  BODY FLUID CELL COUNT WITH DIFFERENTIAL         Narrative: The following orders were created for panel order Body Fluid Cell Count With Differential.                  Procedure                               Abnormality         Status                                     ---------                               -----------         ------                                     Body Fluid Cell Count[[id number]]        Abnormal            Final result                               Body Fluid Differential[[id number]]                          Final result                                                 Please view results for these tests on the individual orders.  BODY FLUID CRYSTAL AND PATHOLOGIST REVIEW         Narrative: The following orders were created for panel order Crystal Identification and Pathologist Review Synovial Fluid.                  Procedure                               Abnormality         Status                                     ---------                               -----------         ------                                     Crystal Identification,  ...[[id number]]                      In process                                                   Please view results for these tests on the individual orders.  GLUCOSE, FLUID         Narrative: The following orders were created for panel order Glucose, Fluid.                  Procedure                               Abnormality         Status                                     ---------                               -----------         ------                                     Glucose, Fluid[[id number]]                                   In process                                                   Please view results for these tests on the individual orders.  PROTEIN, TOTAL FLUID         Narrative: The following orders were created for panel order Protein, Total Fluid.                  Procedure                               Abnormality         Status                                     ---------                               -----------         ------                                     Protein, Total Fluid[[id number]]                             In process                                                   Please view results for these tests on the individual orders.  BODY FLUID CRYSTAL  GLUCOSE, FLUID  PROTEIN, TOTAL FLUID  BODY FLUID CELL DIFFERENTIAL      Patient presents with right knee pain and swelling.  Differential diagnosis of gout, pseudogout, septic arthritis, fracture, knee effusion, osteoarthritis.  Examination shows tenderness of the knee.  Patient has erythema with no significant induration.  He is afebrile nontoxic-appearing.  He does have limited flexion extension of the knee.  Since it is red and he has limited range of motion evaluation for septic arthritis was initiated.  There is higher suspicion of underlying gout as he has had multiple recent joint pain issues and was taking hydrochlorothiazide previously.    CBC CMP sed rate CRP uric acid serum levels x-ray right knee and synovial fluid  analysis was performed.  Patient was given morphine and Zofran offering some improvement of pain.    Arthrocentesis was performed.  Blood work showed CBC with normal white blood cell count making septic arthritis less likely with hemoglobin 13.3.  CRP slightly elevated at 7.25, normal lactate, uric acid level elevated at 7.8.  Chemistry shows glucose 101 potassium 3.2 otherwise normal.  Sed rate slightly elevated 29.  X-ray of the right knee shows knee effusion.  Bedside ultrasound shows that the fluid is primarily below the patella into the joint space and is not primarily a bursa.    Synovial fluid aspiration performed.  Synovial fluid was slightly clear yellow without any purulence.  Cell count shows cloudy yellow with white blood cells at 26,000.  Between 20 and 50,000 and is more likely to be crystal arthropathy.  Since it is not over the 50,000 threshold it is unlikely to be septic arthritis.  Patient's presentation does not entirely lend itself to a septic arthritis.  His uric acid level was also found to be elevated.    Patient will be treated as a most likely gout arthropathy.  He is given prescription for colchicine indomethacin and Norco.  Although less likely septic arthritis is less likely.  Culture is pending as well as crystal analysis protein glucose.  Gram stain is not initially performed at this facility.  Patient will need to follow and trend.  For less likely but possible skin infection and antibiotic prophylaxis doxycycline is initiated.    Patient will be discharged to home with prescription for colchicine, indomethacin, Norco, doxycycline.  Patient is educated in signs and symptoms of worsening symptoms and reasons to come back to the emergency department.  Will need to follow up with orthopedics.  Patient does not report social determinants of health impacting ability to obtain care that is needed.  Patient agrees with plan.    This is a transcription.  Text was reviewed for errors, but some  transcription errors may remain.  Please call for any questions.          Procedure  Arthrocentesis    Performed by: Donovan Mcconnell PA-C  Authorized by: Greta Duvall DO    Consent:     Consent obtained:  Verbal    Consent given by:  Patient    Risks, benefits, and alternatives were discussed: yes      Risks discussed:  Bleeding, infection, pain and incomplete drainage    Alternatives discussed:  No treatment, delayed treatment and observation  Universal protocol:     Procedure explained and questions answered to patient or proxy's satisfaction: yes      Relevant documents present and verified: yes      Test results available: yes      Immediately prior to procedure, a time out was called: yes      Patient identity confirmed:  Verbally with patient  Location:     Location:  Knee    Knee:  R knee  Anesthesia:     Anesthesia method:  Local infiltration    Local anesthetic:  Lidocaine 1% w/o epi  Procedure details:     Preparation: Patient was prepped and draped in usual sterile fashion      Needle gauge:  18 G    Ultrasound guidance: yes      Approach:  Lateral    Aspirate amount:  70mL    Aspirate characteristics:  Yellow    Steroid injected: no      Specimen collected: yes    Post-procedure details:     Dressing:  Adhesive bandage    Procedure completion:  Tolerated well, no immediate complications       Donovan Mcconnell PA-C  12/08/24 5816

## 2024-12-09 LAB — CRYSTALS FLD MICRO: NORMAL

## 2024-12-11 LAB
GLUCOSE FLD-MCNC: 90 MG/DL
PROT FLD-MCNC: 3.8 G/DL

## 2024-12-14 LAB
BACTERIA FLD CULT: NORMAL
GRAM STN SPEC: NORMAL
GRAM STN SPEC: NORMAL

## 2025-01-06 ENCOUNTER — APPOINTMENT (OUTPATIENT)
Dept: ORTHOPEDIC SURGERY | Facility: CLINIC | Age: 55
End: 2025-01-06
Payer: COMMERCIAL

## 2025-01-06 VITALS — WEIGHT: 200 LBS | BODY MASS INDEX: 28 KG/M2 | HEIGHT: 71 IN

## 2025-01-06 DIAGNOSIS — M25.561 RIGHT KNEE PAIN, UNSPECIFIED CHRONICITY: ICD-10-CM

## 2025-01-06 DIAGNOSIS — M17.11 ARTHROPATHY OF RIGHT KNEE: Primary | ICD-10-CM

## 2025-01-06 PROCEDURE — 99204 OFFICE O/P NEW MOD 45 MIN: CPT | Performed by: STUDENT IN AN ORGANIZED HEALTH CARE EDUCATION/TRAINING PROGRAM

## 2025-01-06 PROCEDURE — 3008F BODY MASS INDEX DOCD: CPT | Performed by: STUDENT IN AN ORGANIZED HEALTH CARE EDUCATION/TRAINING PROGRAM

## 2025-01-06 RX ORDER — MELOXICAM 15 MG/1
15 TABLET ORAL DAILY
Qty: 30 TABLET | Refills: 1 | Status: SHIPPED | OUTPATIENT
Start: 2025-01-06 | End: 2025-03-07

## 2025-01-06 ASSESSMENT — PAIN - FUNCTIONAL ASSESSMENT: PAIN_FUNCTIONAL_ASSESSMENT: 0-10

## 2025-01-06 ASSESSMENT — PAIN SCALES - GENERAL: PAINLEVEL_OUTOF10: 1

## 2025-01-06 NOTE — PROGRESS NOTES
PRIMARY CARE PHYSICIAN: No Assigned PCP Generic Provider, MD  REFERRING PROVIDER: Donovan Mcconnell PA-C  [address] [place] [address]     CONSULT ORTHOPAEDIC: Knee Evaluation    ASSESSMENT & PLAN    Impression: 54 y.o. male with right knee patellofemoral pain syndrome and synovitis with possible gout versus pseudogout flare.    Plan:   I explained to the patient the nature of their diagnosis.  I reviewed their imaging studies with them.    Based on the history, physical exam and imaging studies above, the patient's presentation is consistent with the above diagnosis.  I had a long discussion with the patient regarding their presentation and the treatment options.  We discussed initial nonoperative versus operative management options as well as potential further diagnostic imaging.  I reviewed the patient's x-ray findings with him.  He has mild degenerative changes without acute osseous abnormality.  We discussed his treatment options going forward.  I provided him with a referral to physical therapy to work on quadricep strengthening and hamstring flexibility.  I provided him with a prescription for meloxicam as an anti-inflammatory.  He will monitor his progress and return to see us as needed.    Follow-Up: Patient will follow-up as needed    At the end of the visit, all questions were answered in full. The patient is in agreement with the plan and recommendations. They will call the office with any questions/concerns.    Note dictated with [employer] software. Completed without full typed error editing and sent to avoid delay.       SUBJECTIVE  CHIEF COMPLAINT:   Chief Complaint   Patient presents with    Right Knee - Pain        HPI: Mikey Valadez is a 54 y.o. patient who presents today with R knee problem. Seen in the ED on 12/8/24 with XR and aspiration. Tore MCL 30 years ago. No HX of sx, no injections. Reports pain for 1 month. Reports popping sound in the knee over the last few  days.  He notes that the knee will give way on him.  It affects all of his daily activities.  He notes that the knee has improved since his aspiration in the emergency room.  Aspiration was negative for infection and negative for crystals.  His uric acid was slightly elevated.    They deny any constant or progressive numbness or tingling in their legs.     REVIEW OF SYSTEMS  Constitutional: See HPI for pain assessment, No significant weight loss, recent trauma  Cardiovascular: No chest pain, shortness of breath  Respiratory: No difficulty breathing, cough  Gastrointestinal: No nausea, vomiting, diarrhea, constipation  Musculoskeletal: Noted in HPI, positive for pain, restricted motion, stiffness  Integumentary: No rashes, easy bruising, redness   Neurological: no numbness or tingling in extremities, no gait disturbances   Psychiatric: No mood changes, memory changes, social issues  Heme/Lymph: no excessive swelling, easy bruising, excessive bleeding  ENT: No hearing changes  Eyes: No vision changes    No past medical history on file.     No Known Allergies     Past Surgical History:   Procedure Laterality Date    OTHER SURGICAL HISTORY  02/28/2019    Tonsillectomy        Family History   Problem Relation Name Age of Onset    Hyperlipidemia Mother      Hypertension Mother      Skin cancer Mother      Leukemia Father      Heart attack Maternal Grandfather      Other (heavy smoker) Maternal Grandfather      Aneurysm Paternal Grandfather          Social History     Socioeconomic History    Marital status:      Spouse name: Not on file    Number of children: Not on file    Years of education: Not on file    Highest education level: Not on file   Occupational History    Not on file   Tobacco Use    Smoking status: Unknown    Smokeless tobacco: Not on file   Substance and Sexual Activity    Alcohol use: Not on file    Drug use: Not on file    Sexual activity: Not on file   Other Topics Concern    Not on file  "  Social History Narrative    Not on file     Social Drivers of Health     Financial Resource Strain: Not on file   Food Insecurity: Not on file   Transportation Needs: Not on file   Physical Activity: Not on file   Stress: Not on file   Social Connections: Not on file   Intimate Partner Violence: Not on file   Housing Stability: Not on file        CURRENT MEDICATIONS:   Current Outpatient Medications   Medication Sig Dispense Refill    amLODIPine (Norvasc) 10 mg tablet Take 1 tablet (10 mg) by mouth once daily.      amLODIPine (Norvasc) 2.5 mg tablet TAKE 1 TABLET BY MOUTH EVERY DAY 90 tablet 1    atorvastatin (Lipitor) 20 mg tablet Take 1 tablet (20 mg) by mouth once daily.      desloratadine (Clarinex) 5 mg tablet Take 1 tablet (5 mg) by mouth once daily.      fluticasone (Flonase) 50 mcg/actuation nasal spray Administer 2 sprays into each nostril once daily.      omeprazole (PriLOSEC) 40 mg DR capsule TAKE 1 CAPSULE BY MOUTH EVERY DAY 90 capsule 1    selenium sulfide (Selsun) 2.5 % shampoo Apply topically.      tadalafil 20 mg tablet Take 1 tablet (20 mg) by mouth if needed. 1 HOUR BEFOREACTIVITY      testosterone (Axiron) 30 mg/actuation (1.5 mL) topical solution Place 1 Pump on the skin once daily.       No current facility-administered medications for this visit.        OBJECTIVE    PHYSICAL EXAM      12/8/2021    12:39 PM 6/23/2022     9:25 AM 8/23/2022     7:09 AM 11/2/2022    12:48 PM 12/8/2024    11:40 AM 12/8/2024    12:13 PM 12/8/2024     4:22 PM   Vitals   Systolic 180 162  150 138 148 149   Diastolic 90 100  90 88 79 99   BP Location      Left arm    Heart Rate 92    78 73 97   Temp 36.6 °C (97.8 °F) 36.4 °C (97.5 °F)  36.5 °C (97.7 °F) 36.7 °C (98 °F) 36.9 °C (98.4 °F) 36.6 °C (97.9 °F)   Resp 16    16 16 18   Height 1.803 m (5' 11\")  1.801 m (5' 10.91\")   1.803 m (5' 11\")    Weight (lb) 207 219 219.36 210.25  205    BMI 28.87 kg/m2 30.54 kg/m2 30.68 kg/m2 29.4 kg/m2  28.59 kg/m2    BSA (m2) 2.17 m2 " 2.23 m2 2.23 m2 2.18 m2  2.16 m2    Visit Report     Report Report Report      There is no height or weight on file to calculate BMI.    GENERAL: A/Ox3, NAD. Appears healthy, well nourished  PSYCHIATRIC: Mood stable, appropriate memory recall  EYES: EOM intact, no scleral icterus  CARDIOVASCULAR: Palpable peripheral pulses  LUNGS: Breathing non-labored on room air  SKIN: no erythema, rashes, or ecchymoses     MUSCULOSKELETAL:  Laterality: right Knee Exam  - Skin intact  - No erythema or warmth  - No ecchymosis or soft tissue swelling  - Alignment: neutral  - Palpation: Mild tenderness medial and lateral joint lines, mild tenderness medial and lateral patellar facet  - ROM: 0 - 0 - 130, patella mobility 1+ medial and lateral with mild crepitus, no apprehension  - Effusion: Small  - Strength: knee extension and flexion 5/5, EHL/PF/DF motor intact  - Stability:        Anterior drawer stable       Posterior drawer stable       Varus/valgus stable       negative Lachman  -Equivocal Cordell's  - Gait: Mildly antalgic  - Hip Exam: flexion to 100+ degrees, full extension, internal/external rotation adequate, and no pain with log roll    NEUROVASCULAR:  - Neurovascular Status: sensation intact to light touch distally, lower extremity motor intact  - Capillary refill brisk at extremities, Bilateral dorsalis pedis pulse 2+    Imaging: Multiple views of the affected right knee(s) demonstrate: No significant osseous normality, no fracture, no significant degenerative change.   X-rays were personally reviewed and interpreted by me.  Radiology reports were reviewed by me as well, if readily available at the time.      Henrik Schaffer MD  Attending Surgeon    Sports Medicine Orthopaedic Surgery  [facility]

## 2025-01-15 ENCOUNTER — APPOINTMENT (OUTPATIENT)
Dept: ORTHOPEDIC SURGERY | Age: 55
End: 2025-01-15
Payer: COMMERCIAL

## 2025-01-29 ENCOUNTER — TELEPHONE (OUTPATIENT)
Dept: ORTHOPEDIC SURGERY | Age: 55
End: 2025-01-29

## 2025-01-29 ENCOUNTER — APPOINTMENT (OUTPATIENT)
Dept: ORTHOPEDIC SURGERY | Age: 55
End: 2025-01-29
Payer: COMMERCIAL

## 2025-01-29 VITALS — BODY MASS INDEX: 28 KG/M2 | HEIGHT: 71 IN | WEIGHT: 200 LBS

## 2025-01-29 DIAGNOSIS — S83.241A ACUTE MEDIAL MENISCUS TEAR OF RIGHT KNEE, INITIAL ENCOUNTER: ICD-10-CM

## 2025-01-29 PROCEDURE — 3008F BODY MASS INDEX DOCD: CPT | Performed by: STUDENT IN AN ORGANIZED HEALTH CARE EDUCATION/TRAINING PROGRAM

## 2025-01-29 PROCEDURE — 99214 OFFICE O/P EST MOD 30 MIN: CPT | Performed by: STUDENT IN AN ORGANIZED HEALTH CARE EDUCATION/TRAINING PROGRAM

## 2025-01-29 NOTE — TELEPHONE ENCOUNTER
Sigrid called from [facility] and requested that order for MRI R Knee be sent to Fax: [phone].

## 2025-01-29 NOTE — PROGRESS NOTES
PRIMARY CARE PHYSICIAN: No Assigned PCP Generic Provider, MD  REFERRING PROVIDER: No referring provider defined for this encounter.     CONSULT ORTHOPAEDIC: Knee Evaluation    ASSESSMENT & PLAN    Impression: 54 y.o. male with right knee pain and dysfunction concerning for medial meniscus tear.    Plan:   I explained to the patient the nature of their diagnosis.  I reviewed their imaging studies with them.    Based on the history, physical exam and imaging studies above, the patient's presentation is consistent with the above diagnosis.  I had a long discussion with the patient regarding their presentation and the treatment options.  We discussed initial nonoperative versus operative management options as well as potential further diagnostic imaging.  I reviewed the patient's x-ray findings with him.  He had a new twisting injury to the knee which has resulted in pain, swelling and mechanical symptoms.  His knee will give way on him.  This affects all of his daily activities.  I recommend at this point we proceed with an MRI of the right knee to evaluate the status of his soft tissues and rule out a displaced medial meniscus tear that may require surgical intervention.    The MRI is medically necessary and indicated given the patient's subjective complaints and physical exam findings as described below . The MRI will be used for potential presurgical planning purposes. The MRI should be performed in a hospital setting so the surgeon has immediate access to the images.    Follow-Up: Patient will follow-up after the MRI is completed to review the imaging studies and discuss a treatment plan going forward    At the end of the visit, all questions were answered in full. The patient is in agreement with the plan and recommendations. They will call the office with any questions/concerns.    Note dictated with [employer] software. Completed without full typed error editing and sent to avoid delay.        SUBJECTIVE  CHIEF COMPLAINT: Right knee pain    HPI: Mikey Valadez is a 54 y.o. patient who presents today with R knee problem. Patient states on 1/22/25 he was working in the garage when he was on his knee went to stand, twisted his knee and felt immediate pain.  His knee gave way on him.  He has had swelling and mechanical symptoms including catching clicking and locking since.  He localizes his pain to the medial joint line.    Please see below for full history from prior visit:    Seen in the ED on 12/8/24 with XR and aspiration. Tore MCL 30 years ago. No HX of sx, no injections. Reports pain for 1 month. Reports popping sound in the knee over the last few days.  He notes that the knee will give way on him.  It affects all of his daily activities.  He notes that the knee has improved since his aspiration in the emergency room.  Aspiration was negative for infection and negative for crystals.  His uric acid was slightly elevated.    They deny any constant or progressive numbness or tingling in their legs.     REVIEW OF SYSTEMS  Constitutional: See HPI for pain assessment, No significant weight loss, recent trauma  Cardiovascular: No chest pain, shortness of breath  Respiratory: No difficulty breathing, cough  Gastrointestinal: No nausea, vomiting, diarrhea, constipation  Musculoskeletal: Noted in HPI, positive for pain, restricted motion, stiffness  Integumentary: No rashes, easy bruising, redness   Neurological: no numbness or tingling in extremities, no gait disturbances   Psychiatric: No mood changes, memory changes, social issues  Heme/Lymph: no excessive swelling, easy bruising, excessive bleeding  ENT: No hearing changes  Eyes: No vision changes    No past medical history on file.     No Known Allergies     Past Surgical History:   Procedure Laterality Date    OTHER SURGICAL HISTORY  02/28/2019    Tonsillectomy        Family History   Problem Relation Name Age of Onset    Hyperlipidemia Mother       Hypertension Mother      Skin cancer Mother      Leukemia Father      Heart attack Maternal Grandfather      Other (heavy smoker) Maternal Grandfather      Aneurysm Paternal Grandfather          Social History     Socioeconomic History    Marital status:      Spouse name: Not on file    Number of children: Not on file    Years of education: Not on file    Highest education level: Not on file   Occupational History    Not on file   Tobacco Use    Smoking status: Unknown    Smokeless tobacco: Not on file   Substance and Sexual Activity    Alcohol use: Not on file    Drug use: Not on file    Sexual activity: Not on file   Other Topics Concern    Not on file   Social History Narrative    Not on file     Social Drivers of Health     Financial Resource Strain: Not on file   Food Insecurity: Not on file   Transportation Needs: Not on file   Physical Activity: Not on file   Stress: Not on file   Social Connections: Not on file   Intimate Partner Violence: Not on file   Housing Stability: Not on file        CURRENT MEDICATIONS:   Current Outpatient Medications   Medication Sig Dispense Refill    amLODIPine (Norvasc) 10 mg tablet Take 1 tablet (10 mg) by mouth once daily.      amLODIPine (Norvasc) 2.5 mg tablet TAKE 1 TABLET BY MOUTH EVERY DAY 90 tablet 1    atorvastatin (Lipitor) 20 mg tablet Take 1 tablet (20 mg) by mouth once daily.      desloratadine (Clarinex) 5 mg tablet Take 1 tablet (5 mg) by mouth once daily.      fluticasone (Flonase) 50 mcg/actuation nasal spray Administer 2 sprays into each nostril once daily.      meloxicam (Mobic) 15 mg tablet Take 1 tablet (15 mg) by mouth once daily. 30 tablet 1    omeprazole (PriLOSEC) 40 mg DR capsule TAKE 1 CAPSULE BY MOUTH EVERY DAY 90 capsule 1    selenium sulfide (Selsun) 2.5 % shampoo Apply topically.      tadalafil 20 mg tablet Take 1 tablet (20 mg) by mouth if needed. 1 HOUR BEFOREACTIVITY      testosterone (Axiron) 30 mg/actuation (1.5 mL) topical solution  "Place 1 Pump on the skin once daily.       No current facility-administered medications for this visit.        OBJECTIVE    PHYSICAL EXAM      6/23/2022     9:25 AM 8/23/2022     7:09 AM 11/2/2022    12:48 PM 12/8/2024    11:40 AM 12/8/2024    12:13 PM 12/8/2024     4:22 PM 1/6/2025    10:12 AM   Vitals   Systolic 162  150 138 148 149    Diastolic 100  90 88 79 99    BP Location     Left arm     Heart Rate    78 73 97    Temp 36.4 °C (97.5 °F)  36.5 °C (97.7 °F) 36.7 °C (98 °F) 36.9 °C (98.4 °F) 36.6 °C (97.9 °F)    Resp    16 16 18    Height  1.801 m (5' 10.91\")   1.803 m (5' 11\")  1.803 m (5' 11\")   Weight (lb) 219 219.36 210.25  205  200   BMI 30.54 kg/m2 30.68 kg/m2 29.4 kg/m2  28.59 kg/m2  27.89 kg/m2   BSA (m2) 2.23 m2 2.23 m2 2.18 m2  2.16 m2  2.13 m2   Visit Report    Report Report Report Report      There is no height or weight on file to calculate BMI.    GENERAL: A/Ox3, NAD. Appears healthy, well nourished  PSYCHIATRIC: Mood stable, appropriate memory recall  EYES: EOM intact, no scleral icterus  CARDIOVASCULAR: Palpable peripheral pulses  LUNGS: Breathing non-labored on room air  SKIN: no erythema, rashes, or ecchymoses     MUSCULOSKELETAL:  Laterality: right Knee Exam  - Skin intact  - No erythema or warmth  - No ecchymosis or soft tissue swelling  - Alignment: neutral  - Palpation: Positive tenderness medial joint line, mild tenderness medial and lateral patellar facet  - ROM: 0 - 0 - 130, patella mobility 1+ medial and lateral with mild crepitus, no apprehension  - Effusion: Small  - Strength: knee extension and flexion 5/5, EHL/PF/DF motor intact  - Stability:        Anterior drawer stable       Posterior drawer stable       Varus/valgus stable       negative Lachman  -Positive Cordell's  - Gait: Mildly antalgic  - Hip Exam: flexion to 100+ degrees, full extension, internal/external rotation adequate, and no pain with log roll    NEUROVASCULAR:  - Neurovascular Status: sensation intact to light " touch distally, lower extremity motor intact  - Capillary refill brisk at extremities, Bilateral dorsalis pedis pulse 2+    Imaging: Multiple views of the affected right knee(s) demonstrate: No significant osseous normality, no fracture, no significant degenerative change.   X-rays were personally reviewed and interpreted by me.  Radiology reports were reviewed by me as well, if readily available at the time.      Henrik Schaffer MD  Attending Surgeon    Sports Medicine Orthopaedic Surgery  [facility]

## 2025-01-30 ENCOUNTER — APPOINTMENT (OUTPATIENT)
Dept: ORTHOPEDIC SURGERY | Facility: HOSPITAL | Age: 55
End: 2025-01-30
Payer: COMMERCIAL

## 2025-02-05 ENCOUNTER — APPOINTMENT (OUTPATIENT)
Dept: ORTHOPEDIC SURGERY | Age: 55
End: 2025-02-05
Payer: COMMERCIAL

## 2025-02-05 VITALS — HEIGHT: 71 IN | WEIGHT: 200 LBS | BODY MASS INDEX: 28 KG/M2

## 2025-02-05 DIAGNOSIS — S83.241D ACUTE MEDIAL MENISCUS TEAR, RIGHT, SUBSEQUENT ENCOUNTER: Primary | ICD-10-CM

## 2025-02-05 PROCEDURE — 99214 OFFICE O/P EST MOD 30 MIN: CPT | Performed by: STUDENT IN AN ORGANIZED HEALTH CARE EDUCATION/TRAINING PROGRAM

## 2025-02-05 PROCEDURE — 3008F BODY MASS INDEX DOCD: CPT | Performed by: STUDENT IN AN ORGANIZED HEALTH CARE EDUCATION/TRAINING PROGRAM

## 2025-02-05 NOTE — PROGRESS NOTES
PRIMARY CARE PHYSICIAN: No Assigned PCP Generic Provider, MD  REFERRING PROVIDER: No referring provider defined for this encounter.     CONSULT ORTHOPAEDIC: Knee Evaluation    ASSESSMENT & PLAN    Impression: 54 y.o. male with right knee pain and dysfunction secondary to a complex medial meniscus tear.    Plan:   I explained to the patient the nature of their diagnosis.  I reviewed their imaging studies with them.    Based on the history, physical exam and imaging studies above, the patient's presentation is consistent with the above diagnosis.  I had a long discussion with the patient regarding their presentation and the treatment options.  We discussed initial nonoperative versus operative management options as well as potential further diagnostic imaging.  I reviewed the patient's MRI findings with him.  His pain is improving as is his swelling.  He does have a complex medial meniscus tear that appears to be the source of his pain and swelling.  However, as his pain and swelling are improving I believe it is reasonable to proceed with nonoperative management for now.  If his pain, swelling or mechanical symptoms worsen then he will return to see me for evaluation for possible arthroscopic partial meniscectomy.  We discussed the possibility of surgical intervention at this time but he notes he would like to hold off and attempt nonoperative management which is reasonable.    Follow-Up: Patient will follow-up as needed    At the end of the visit, all questions were answered in full. The patient is in agreement with the plan and recommendations. They will call the office with any questions/concerns.    Note dictated with [email or web address] software. Completed without full typed error editing and sent to avoid delay.       SUBJECTIVE  CHIEF COMPLAINT: Right knee pain    HPI: Mikey Valadez is a 54 y.o. patient who presents today with R knee problem. He is here to follow up after his MRI completed at  [facility].  He notes that his pain and swelling are improving as are his mechanical symptoms.    HPI from 1/29/25  Patient states on 1/22/25 he was working in the garage when he was on his knee went to stand, twisted his knee and felt immediate pain.  His knee gave way on him.  He has had swelling and mechanical symptoms including catching clicking and locking since.  He localizes his pain to the medial joint line.    Please see below for full history from prior visit:    Seen in the ED on 12/8/24 with XR and aspiration. Tore MCL 30 years ago. No HX of sx, no injections. Reports pain for 1 month. Reports popping sound in the knee over the last few days.  He notes that the knee will give way on him.  It affects all of his daily activities.  He notes that the knee has improved since his aspiration in the emergency room.  Aspiration was negative for infection and negative for crystals.  His uric acid was slightly elevated.    They deny any constant or progressive numbness or tingling in their legs.     REVIEW OF SYSTEMS  Constitutional: See HPI for pain assessment, No significant weight loss, recent trauma  Cardiovascular: No chest pain, shortness of breath  Respiratory: No difficulty breathing, cough  Gastrointestinal: No nausea, vomiting, diarrhea, constipation  Musculoskeletal: Noted in HPI, positive for pain, restricted motion, stiffness  Integumentary: No rashes, easy bruising, redness   Neurological: no numbness or tingling in extremities, no gait disturbances   Psychiatric: No mood changes, memory changes, social issues  Heme/Lymph: no excessive swelling, easy bruising, excessive bleeding  ENT: No hearing changes  Eyes: No vision changes    No past medical history on file.     No Known Allergies     Past Surgical History:   Procedure Laterality Date    OTHER SURGICAL HISTORY  02/28/2019    Tonsillectomy        Family History   Problem Relation Name Age of Onset    Hyperlipidemia Mother       Hypertension Mother      Skin cancer Mother      Leukemia Father      Heart attack Maternal Grandfather      Other (heavy smoker) Maternal Grandfather      Aneurysm Paternal Grandfather          Social History     Socioeconomic History    Marital status:      Spouse name: Not on file    Number of children: Not on file    Years of education: Not on file    Highest education level: Not on file   Occupational History    Not on file   Tobacco Use    Smoking status: Unknown    Smokeless tobacco: Not on file   Substance and Sexual Activity    Alcohol use: Not on file    Drug use: Not on file    Sexual activity: Not on file   Other Topics Concern    Not on file   Social History Narrative    Not on file     Social Drivers of Health     Financial Resource Strain: Not on file   Food Insecurity: Not on file   Transportation Needs: Not on file   Physical Activity: Not on file   Stress: Not on file   Social Connections: Not on file   Intimate Partner Violence: Not on file   Housing Stability: Not on file        CURRENT MEDICATIONS:   Current Outpatient Medications   Medication Sig Dispense Refill    amLODIPine (Norvasc) 10 mg tablet Take 1 tablet (10 mg) by mouth once daily.      amLODIPine (Norvasc) 2.5 mg tablet TAKE 1 TABLET BY MOUTH EVERY DAY 90 tablet 1    atorvastatin (Lipitor) 20 mg tablet Take 1 tablet (20 mg) by mouth once daily.      desloratadine (Clarinex) 5 mg tablet Take 1 tablet (5 mg) by mouth once daily.      fluticasone (Flonase) 50 mcg/actuation nasal spray Administer 2 sprays into each nostril once daily.      meloxicam (Mobic) 15 mg tablet Take 1 tablet (15 mg) by mouth once daily. 30 tablet 1    omeprazole (PriLOSEC) 40 mg DR capsule TAKE 1 CAPSULE BY MOUTH EVERY DAY 90 capsule 1    selenium sulfide (Selsun) 2.5 % shampoo Apply topically.      tadalafil 20 mg tablet Take 1 tablet (20 mg) by mouth if needed. 1 HOUR BEFOREACTIVITY      testosterone (Axiron) 30 mg/actuation (1.5 mL) topical solution  "Place 1 Pump on the skin once daily.       No current facility-administered medications for this visit.        OBJECTIVE    PHYSICAL EXAM      8/23/2022     7:09 AM 11/2/2022    12:48 PM 12/8/2024    11:40 AM 12/8/2024    12:13 PM 12/8/2024     4:22 PM 1/6/2025    10:12 AM 1/29/2025     9:07 AM   Vitals   Systolic  150 138 148 149     Diastolic  90 88 79 99     BP Location    Left arm      Heart Rate   78 73 97     Temp  36.5 °C (97.7 °F) 36.7 °C (98 °F) 36.9 °C (98.4 °F) 36.6 °C (97.9 °F)     Resp   16 16 18     Height 1.801 m (5' 10.91\")   1.803 m (5' 11\")  1.803 m (5' 11\") 1.803 m (5' 11\")   Weight (lb) 219.36 210.25  205  200 200   BMI 30.68 kg/m2 29.4 kg/m2  28.59 kg/m2  27.89 kg/m2 27.89 kg/m2   BSA (m2) 2.23 m2 2.18 m2  2.16 m2  2.13 m2 2.13 m2   Visit Report   Report Report Report Report Report      There is no height or weight on file to calculate BMI.    GENERAL: A/Ox3, NAD. Appears healthy, well nourished  PSYCHIATRIC: Mood stable, appropriate memory recall  EYES: EOM intact, no scleral icterus  CARDIOVASCULAR: Palpable peripheral pulses  LUNGS: Breathing non-labored on room air  SKIN: no erythema, rashes, or ecchymoses     MUSCULOSKELETAL:  Laterality: right Knee Exam  - Skin intact  - No erythema or warmth  - No ecchymosis or soft tissue swelling  - Alignment: neutral  - Palpation: Positive tenderness medial joint line, mild tenderness medial and lateral patellar facet  - ROM: 0 - 0 - 130, patella mobility 1+ medial and lateral with mild crepitus, no apprehension  - Effusion: Small  - Strength: knee extension and flexion 5/5, EHL/PF/DF motor intact  - Stability:        Anterior drawer stable       Posterior drawer stable       Varus/valgus stable       negative Lachman  -Mildly positive Cordell's  - Gait: Mildly antalgic  - Hip Exam: flexion to 100+ degrees, full extension, internal/external rotation adequate, and no pain with log roll    NEUROVASCULAR:  - Neurovascular Status: sensation intact to " light touch distally, lower extremity motor intact  - Capillary refill brisk at extremities, Bilateral dorsalis pedis pulse 2+    Imaging: MRI of the right knee reviewed by me demonstrates minimal degenerative changes, complex medial meniscus tear with a horizontal cleavage component extending into the posterior horn, lateral meniscus appears intact, ligamentous structures intact, chondromalacia patellofemoral joint and tricompartmental synovitis.  Images were personally reviewed and interpreted by me.  Radiology reports were reviewed by me as well, if readily available at the time.      Henrik Schaffer MD  Attending Surgeon    Sports Medicine Orthopaedic Surgery  [facility] [place]  [employer] [facility] [employer]

## 2025-02-10 ENCOUNTER — EVALUATION (OUTPATIENT)
Dept: PHYSICAL THERAPY | Facility: CLINIC | Age: 55
End: 2025-02-10
Payer: COMMERCIAL

## 2025-02-10 DIAGNOSIS — M25.561 RIGHT KNEE PAIN, UNSPECIFIED CHRONICITY: Primary | ICD-10-CM

## 2025-02-10 PROCEDURE — 97161 PT EVAL LOW COMPLEX 20 MIN: CPT | Mod: GP | Performed by: PHYSICAL THERAPIST

## 2025-02-10 PROCEDURE — 97110 THERAPEUTIC EXERCISES: CPT | Mod: GP | Performed by: PHYSICAL THERAPIST

## 2025-02-10 ASSESSMENT — PAIN SCALES - GENERAL: PAINLEVEL_OUTOF10: 0 - NO PAIN

## 2025-02-10 ASSESSMENT — PAIN - FUNCTIONAL ASSESSMENT: PAIN_FUNCTIONAL_ASSESSMENT: 0-10

## 2025-02-10 ASSESSMENT — ENCOUNTER SYMPTOMS
DEPRESSION: 0
LOSS OF SENSATION IN FEET: 0
OCCASIONAL FEELINGS OF UNSTEADINESS: 0

## 2025-02-10 NOTE — PROGRESS NOTES
Physical Therapy    Physical Therapy Lower Extremity Evaluation    Patient Name: Mikey Valadez  MRN: [id number]  Today's Date: 2/10/2025                          Payor: JANEEN / Plan: JANEEN HMP / Product Type: *No Product type* /     Reason for Referral: R knee  General Comment: Visit 1 of AUTH    Current Problem  Problem List Items Addressed This Visit    None  Visit Diagnoses         Codes    Right knee pain, unspecified chronicity    -  Primary M25.561    Relevant Orders    Follow Up In Physical Therapy             Precautions  Precautions  Precautions Comment: None       Pain  Pain Assessment: 0-10  0-10 (Numeric) Pain Score: 0 - No pain  Pain at worst: 3/10    SUBJECTIVE:   Pain location:   Right knee pain and dysfunction secondary to a complex medial meniscus tear per MD notes based on MRI findings      Onset: 22 getting out of low car. Twisted his knee and felt immediate pain   +giving way, swelling and catching/locking   Pain medial joint line   Went to the ER-had his knee aspirated   He re-aggravated it while doing some things in the garage   He is being extremely cautious and trying to use the cane       Reviewed medical hx form     Imagin24 XR R knee  FINDINGS:  No fractures or destructive lesions are identified. The joint spaces  and articular surfaces are maintained. The alignment is anatomic. The  soft tissues are unremarkable. There is moderate effusion in the  suprapatellar bursa.      IMPRESSION:  Moderate suprapatellar joint effusion without underlying fracture or  subluxation.    Prior level of function:   Previously independent with all activity     Functional limitations:  Amb with cane   Wears compression sleeve for stability     Home setup:  Reviewed and no concern     Work:  -computer work primarily     Patient stated goal:  Strengthen knee, resume running. Runs on roads avg about 10 miles per week. Has not run since September     Prior tx:  No  PT    OBJECTIVE:    Lower Extremity ROM: (WNL unless documented below) (p=pain)   ROM in Degrees  RIGHT LEFT   Knee Extension 5 0   Knee Flexion 125 p  145     Lower Extremity STRENGTH: (WNL unless documented below) (p=pain)   MMT 5/5 max  RIGHT LEFT   Hip Flexion 4+ 5   Hip Extension 5 5   Hip Abduction 5 5   Hip Adduction 5 5   Knee Extension 4+ 5   Knee Flexion 4+ 5     Lower Extremity FLEXIBILITY: (WNL unless documented below) (p=pain)  Flexibility  RIGHT LEFT   Quad 10 in  9 in    Hamstring  45  41   Gastroc  Min loss Min loss   Soleus  Min loss Min loss      Joint mobility: good patellar mobility   Girth: 1 cm effusion R knee (mid patella)   +quad atrophy R  Gait: amb with standard cane     Special tests: (WNL unless documented below)   NT due to MRI results    Outcome Measure:  Other Measures  Lower Extremity Funtional Score (LEFS): 49/80    TREATMENT:  Initial evaluation completed. Issued and reviewed HEP with pt that included:  Access Code: [email or web address]  URL: [email or web address]  Date: 02/10/2025  Prepared by: Naomi John    Exercises  - Standing Terminal Knee Extension with Resistance  - 1 x daily - 7 x weekly - 3 sets - 10 reps - 3 seconds hold  - Seated Hamstring Curl with Anchored Resistance  - 1 x daily - 7 x weekly - 3 sets - 10 reps  - Straight Leg Raise with Arm Support  - 1 x daily - 7 x weekly - 2-3 sets - 10 reps - 3 seconds hold  - Standing Hamstring Stretch with Step  - 1 x daily - 7 x weekly - 1 minute hold  - Standing Bilateral Gastroc Stretch with Step  - 1 x daily - 7 x weekly - 1 minute hold  - Prone Quadriceps Stretch with Strap  - 1 x daily - 7 x weekly - 1 minute  hold    ASSESSEMENT  The pt presents with signs and symptoms consistent with the physical therapy diagnosis of R knee pain secondary to meniscus tear.   Pt demonstrates decreased R knee extension and flexion with pain. MRI confirmed meniscus tear. Discussed proper gait mechanics including increased knee  flexion and heel strike. The pt will benefit from skilled physical therapy to reduce impairments in order to return to prior level of function, reduce pain, increase strength and ROM and restore gait/balance.     The physical therapy prognosis is good for the patient to achieve their goals.   The pt tolerated therapy treatment today well with no adverse effects.    Personal factors/barriers to learning that impact therapy include:  None  Clinical presentation: Stable and/or uncomplicated characteristics  Level of clinical decision making is Low    PLAN  The pt will be seen 1-2 time(s) a week for 6 weeks.  (Per auth)     The pt has been educated about the risks and benefits of physical therapy including manual therapy treatments and gives consent for treatment.     The patient will benefit from physical therapy treatment to include: therapeutic exercises, therapeutic activities, neurological re-education, manual therapy, modalities, and a home exercise program.     Goals:  Active       PT Problem       Reduce pain at worst to 2/10 with all functional and recreational activity.        Start:  02/10/25    Expected End:  05/11/25            Increase ROM/flexibility to WFL to perform daily functional activities including amb with proper gait and running mechanics, negotiating stairs reciprocally        Start:  02/10/25    Expected End:  05/11/25            Increase by > or = 1/2 mm grade to improve amb with non antalgic gait, stair negotiation reciprocally, transfers and safe return to running to perform daily tasks without increased pain/compensation         Start:  02/10/25    Expected End:  05/11/25            Pt to score an increase of 10 points or > on LEFS to display overall increased function.         Start:  02/10/25    Expected End:  05/11/25            Patient will demonstrate independence in home program for support of progression       Start:  02/10/25    Expected End:  05/11/25

## 2025-03-04 ENCOUNTER — APPOINTMENT (OUTPATIENT)
Dept: PHYSICAL THERAPY | Facility: CLINIC | Age: 55
End: 2025-03-04
Payer: COMMERCIAL

## 2025-03-08 DIAGNOSIS — M17.11 ARTHROPATHY OF RIGHT KNEE: ICD-10-CM

## 2025-03-09 RX ORDER — MELOXICAM 15 MG/1
15 TABLET ORAL DAILY
Qty: 30 TABLET | Refills: 1 | Status: SHIPPED | OUTPATIENT
Start: 2025-03-09

## 2025-03-10 NOTE — PROGRESS NOTES
Physical Therapy Treatment    Patient Name: Mikey Valadez  MRN: [id number]  Today's Date: 3/11/2025  Time Calculation  Start Time: 1048  Stop Time: 1130  Time Calculation (min): 42 min     PT Therapeutic Procedures Time Entry  Therapeutic Exercise Time Entry: 42                 Payor: JANEEN / Plan: JANEEN HMP / Product Type: *No Product type* /     Reason for Referral: R knee  General Comment: Visit 2 of 6    Current Problem:  Problem List Items Addressed This Visit    None  Visit Diagnoses         Codes    Right knee pain, unspecified chronicity     M25.561            Subjective   Injured L knee moving something over the weekend. Blow to the knee.   Localized pain over L patella region.  Otherwise HEP is going well for the R knee. R knee is about 90%   Some clicking, no crepitus however  L knee pain keep him up at night    HEP compliance: yes     Pain:  Pain Assessment: 0-10  0-10 (Numeric) Pain Score: 6  Pain location: Bilateral knee pain     Precautions:  Precautions  Precautions Comment: None      Objective   Antalgic gait, rigid LLE   Localized TTP over L patella   1 cm mid patella effusion on L.     Treatment:    Therapeutic exercise  Upright bike seat 9 rocking to full revolution  Leg press SEAT 8, 80# x 10, 100# 2 x 10   Hurdles small down and back x 2   Seated LAQ RTB 2x10, No band 2x10  Calf stretch incline bd x 1 min     SAQ blue wedge 2# L, 3# R 2x10    Manual   NOT APPROVED     HEP  Access Code: [email or web address]  URL: [email or web address]  Date: 03/11/2025  Prepared by: Naomi John    Exercises  - Prone Quadriceps Stretch with Strap  - 1 x daily - 7 x weekly - 1 minute  hold  - Standing Bilateral Gastroc Stretch with Step  - 1 x daily - 7 x weekly - 1 minute hold  - Standing Hamstring Stretch with Step  - 1 x daily - 7 x weekly - 1 minute hold  - Standing Terminal Knee Extension with Resistance  - 1 x daily - 7 x weekly - 3 sets - 10 reps - 3 seconds hold  - Seated Hamstring Curl with  Anchored Resistance  - 1 x daily - 7 x weekly - 3 sets - 10 reps  - Straight Leg Raise with Arm Support  - 1 x daily - 7 x weekly - 2-3 sets - 10 reps - 3 seconds hold  - Seated Knee Extension with Resistance  - 1 x daily - 7 x weekly - 2-3 sets - 10 reps - 3 second hold  hold  - Prone Knee Flexion  - 1 x daily - 7 x weekly - 2-3 sets - 10 reps  - Prone Quadriceps Set  - 1 x daily - 7 x weekly - 2-3 sets - 10 reps - 3 seconds hold    Assessment  Pt did well with exercises and cues to bend L knee. He does have localized TTP over L patella and some warmth and effusion. Suspect contusion but will continue to monitor.     Plan  Continue to progress POC as tolerated by patient to improve strength, mobility and overall function    Goals:  Active       PT Problem       Reduce pain at worst to 2/10 with all functional and recreational activity.        Start:  02/10/25    Expected End:  05/11/25            Increase ROM/flexibility to WFL to perform daily functional activities including amb with proper gait and running mechanics, negotiating stairs reciprocally        Start:  02/10/25    Expected End:  05/11/25            Increase by > or = 1/2 mm grade to improve amb with non antalgic gait, stair negotiation reciprocally, transfers and safe return to running to perform daily tasks without increased pain/compensation         Start:  02/10/25    Expected End:  05/11/25            Pt to score an increase of 10 points or > on LEFS to display overall increased function.         Start:  02/10/25    Expected End:  05/11/25            Patient will demonstrate independence in home program for support of progression       Start:  02/10/25    Expected End:  05/11/25

## 2025-03-11 ENCOUNTER — TREATMENT (OUTPATIENT)
Dept: PHYSICAL THERAPY | Facility: CLINIC | Age: 55
End: 2025-03-11
Payer: COMMERCIAL

## 2025-03-11 DIAGNOSIS — M25.561 RIGHT KNEE PAIN, UNSPECIFIED CHRONICITY: ICD-10-CM

## 2025-03-11 PROCEDURE — 97110 THERAPEUTIC EXERCISES: CPT | Mod: GP | Performed by: PHYSICAL THERAPIST

## 2025-03-11 ASSESSMENT — PAIN SCALES - GENERAL: PAINLEVEL_OUTOF10: 6

## 2025-03-11 ASSESSMENT — PAIN - FUNCTIONAL ASSESSMENT: PAIN_FUNCTIONAL_ASSESSMENT: 0-10

## 2025-03-17 NOTE — PROGRESS NOTES
Physical Therapy Treatment    Patient Name: Mikey Valadez  MRN: [id number]  Today's Date: 3/17/2025                          Payor: JANEEN / Plan: JANEEN HMP / Product Type: *No Product type* /          Current Problem:  Problem List Items Addressed This Visit    None        Subjective   Injured L knee moving something over the weekend. Blow to the knee.   Localized pain over L patella region.  Otherwise HEP is going well for the R knee. R knee is about 90%   Some clicking, no crepitus however  L knee pain keep him up at night    HEP compliance: yes     Pain:     Pain location: Bilateral knee pain     Precautions:         Objective   Antalgic gait, rigid LLE   Localized TTP over L patella   1 cm mid patella effusion on L.     Treatment:    Therapeutic exercise  Upright bike seat 9 rocking to full revolution  Leg press SEAT 8, 80# x 10, 100# 2 x 10   Hurdles small down and back x 2   Seated LAQ RTB 2x10, No band 2x10  Calf stretch incline bd x 1 min     SAQ blue wedge 2# L, 3# R 2x10    Manual   NOT APPROVED     HEP  Access Code: [email or web address]  URL: [email or web address]  Date: 03/11/2025  Prepared by: Naomi John    Exercises  - Prone Quadriceps Stretch with Strap  - 1 x daily - 7 x weekly - 1 minute  hold  - Standing Bilateral Gastroc Stretch with Step  - 1 x daily - 7 x weekly - 1 minute hold  - Standing Hamstring Stretch with Step  - 1 x daily - 7 x weekly - 1 minute hold  - Standing Terminal Knee Extension with Resistance  - 1 x daily - 7 x weekly - 3 sets - 10 reps - 3 seconds hold  - Seated Hamstring Curl with Anchored Resistance  - 1 x daily - 7 x weekly - 3 sets - 10 reps  - Straight Leg Raise with Arm Support  - 1 x daily - 7 x weekly - 2-3 sets - 10 reps - 3 seconds hold  - Seated Knee Extension with Resistance  - 1 x daily - 7 x weekly - 2-3 sets - 10 reps - 3 second hold  hold  - Prone Knee Flexion  - 1 x daily - 7 x weekly - 2-3 sets - 10 reps  - Prone Quadriceps Set  - 1 x daily - 7  x weekly - 2-3 sets - 10 reps - 3 seconds hold    Assessment  Pt did well with exercises and cues to bend L knee. He does have localized TTP over L patella and some warmth and effusion. Suspect contusion but will continue to monitor.     Plan  Continue to progress POC as tolerated by patient to improve strength, mobility and overall function    Goals:  Active       PT Problem       Reduce pain at worst to 2/10 with all functional and recreational activity.        Start:  02/10/25    Expected End:  05/11/25            Increase ROM/flexibility to WFL to perform daily functional activities including amb with proper gait and running mechanics, negotiating stairs reciprocally        Start:  02/10/25    Expected End:  05/11/25            Increase by > or = 1/2 mm grade to improve amb with non antalgic gait, stair negotiation reciprocally, transfers and safe return to running to perform daily tasks without increased pain/compensation         Start:  02/10/25    Expected End:  05/11/25            Pt to score an increase of 10 points or > on LEFS to display overall increased function.         Start:  02/10/25    Expected End:  05/11/25            Patient will demonstrate independence in home program for support of progression       Start:  02/10/25    Expected End:  05/11/25

## 2025-03-18 ENCOUNTER — APPOINTMENT (OUTPATIENT)
Dept: PHYSICAL THERAPY | Facility: CLINIC | Age: 55
End: 2025-03-18
Payer: COMMERCIAL

## 2025-03-25 ENCOUNTER — APPOINTMENT (OUTPATIENT)
Dept: PHYSICAL THERAPY | Facility: CLINIC | Age: 55
End: 2025-03-25
Payer: COMMERCIAL

## 2025-04-01 ENCOUNTER — APPOINTMENT (OUTPATIENT)
Dept: PHYSICAL THERAPY | Facility: CLINIC | Age: 55
End: 2025-04-01
Payer: COMMERCIAL

## 2025-04-08 ENCOUNTER — APPOINTMENT (OUTPATIENT)
Dept: PHYSICAL THERAPY | Facility: CLINIC | Age: 55
End: 2025-04-08
Payer: COMMERCIAL

## 2025-05-09 DIAGNOSIS — M17.11 ARTHROPATHY OF RIGHT KNEE: ICD-10-CM

## 2025-05-09 RX ORDER — MELOXICAM 15 MG/1
15 TABLET ORAL DAILY
Qty: 30 TABLET | Refills: 1 | Status: SHIPPED | OUTPATIENT
Start: 2025-05-09

## 2025-06-17 ENCOUNTER — TELEPHONE (OUTPATIENT)
Facility: CLINIC | Age: 55
End: 2025-06-17
Payer: COMMERCIAL

## 2025-06-17 DIAGNOSIS — Z12.11 SCREENING FOR COLON CANCER: Primary | ICD-10-CM

## 2025-06-17 NOTE — TELEPHONE ENCOUNTER
----- Message from Tanja STEPHENSON sent at 6/17/2025  9:36 AM EDT -----  Regarding: colon recall  Per chart recall, pt due for repeat colonoscopy  - can you place the order for the screening colonoscopy and send to [facility] .  Thank you.    #Chart reviewed for age, BMI and anticoagulants.

## 2025-06-20 NOTE — TELEPHONE ENCOUNTER
Left message on Machine to return call for Open Access Colonoscopy Screening Form.   My chart message sent